# Patient Record
Sex: MALE
[De-identification: names, ages, dates, MRNs, and addresses within clinical notes are randomized per-mention and may not be internally consistent; named-entity substitution may affect disease eponyms.]

---

## 2021-01-05 PROBLEM — Z00.00 ENCOUNTER FOR PREVENTIVE HEALTH EXAMINATION: Status: ACTIVE | Noted: 2021-01-05

## 2021-01-07 ENCOUNTER — LABORATORY RESULT (OUTPATIENT)
Age: 31
End: 2021-01-07

## 2021-01-07 ENCOUNTER — APPOINTMENT (OUTPATIENT)
Dept: SURGERY | Facility: CLINIC | Age: 31
End: 2021-01-07
Payer: MEDICAID

## 2021-01-07 VITALS
BODY MASS INDEX: 22.88 KG/M2 | DIASTOLIC BLOOD PRESSURE: 84 MMHG | HEIGHT: 68 IN | SYSTOLIC BLOOD PRESSURE: 133 MMHG | HEART RATE: 64 BPM | WEIGHT: 151 LBS

## 2021-01-07 VITALS — TEMPERATURE: 97.6 F

## 2021-01-07 DIAGNOSIS — Z56.0 UNEMPLOYMENT, UNSPECIFIED: ICD-10-CM

## 2021-01-07 DIAGNOSIS — K43.9 VENTRAL HERNIA W/OUT OBSTRUCTION OR GANGRENE: ICD-10-CM

## 2021-01-07 DIAGNOSIS — Z78.9 OTHER SPECIFIED HEALTH STATUS: ICD-10-CM

## 2021-01-07 PROCEDURE — 99203 OFFICE O/P NEW LOW 30 MIN: CPT

## 2021-01-07 PROCEDURE — 99072 ADDL SUPL MATRL&STAF TM PHE: CPT

## 2021-01-07 SDOH — ECONOMIC STABILITY - INCOME SECURITY: UNEMPLOYMENT, UNSPECIFIED: Z56.0

## 2021-01-07 NOTE — PHYSICAL EXAM
[Normal Breath Sounds] : Normal breath sounds [Normal Rate and Rhythm] : normal rate and rhythm [No Rash or Lesion] : No rash or lesion [Alert] : alert [Oriented to Person] : oriented to person [Oriented to Place] : oriented to place [Oriented to Time] : oriented to time [Calm] : calm [JVD] : no jugular venous distention  [de-identified] : A/Ox3; NAD. appears comfortable [de-identified] : EOMI; sclera anicteric. Nasal mucosa pink, septum midline. Oral mucosa pink. Tongue midline, Pharynx without exudates. [de-identified] : abd is soft, NT/ND\par reducible ventral hernia  [de-identified] : +ROM, no joint swelling

## 2021-01-07 NOTE — DATA REVIEWED
[FreeTextEntry1] : Date of Exam: 12-\par  \par EXAM:  US HERNIA ABDOMINAL WALL\par \par HISTORY:  Evaluate for hernia.\par \par TECHNIQUE: Longitudinal and transverse grayscale images were acquired of the anterior abdominal wall using high frequency transducers. The examination was performed in the supine and standing positions with and without Valsalva maneuver.\par \par COMPARISON:  None Available.\par \par FINDINGS:\par \par There is a fat-containing ventral hernia at 11:00, 5.6 cm from the umbilicus. No herniated bowel is seen. Hernia contents are partially reducible with external pressure.\par MEASUREMENTS: \par Supine with Valsalva\par Hernia contents:1.4 x 0.6 x 1.2 cm  (Transverse x AP x CC)\par Hernia neck:0.2 cm (Transverse)\par Standing with Valsalva\par Hernia contents:1.8 x 0.5 x 1.9 cm (Transverse x AP x CC)\par \par There is a fat-containing ventral hernia at 11:00, 4.6 cm from the umbilicus. No herniated bowel is seen. Hernia contents are partially reducible with external pressure.\par MEASUREMENTS: \par Supine with Valsalva\par Hernia contents:2.6 x 0.7 x 2.8 cm  (Transverse x AP x CC)\par Hernia neck:0.4 cm (Transverse)\par Standing with Valsalva\par Hernia contents:3.0 x 0.8 x 3.2 cm (Transverse x AP x CC)\par \par There is a fat-containing ventral hernia at 12:00, 2.6 cm from the umbilicus. No herniated bowel is seen. Hernia contents are partially reducible with external pressure.\par MEASUREMENTS: \par Supine with Valsalva\par Hernia contents:1.9 x 0.8 x 3.1 cm  (Transverse x AP x CC)\par Hernia neck:0.4 cm (Transverse)\par Standing with Valsalva\par Hernia contents:2.0 x 0.8 x 3.2 cm (Transverse x AP x CC)\par \par \par IMPRESSION:\par 1.  Fat-containing, partially reducible ventral hernia at 11:00, 5.6 cm from the umbilicus. \par 2.  Fat-containing, partially reducible ventral hernia at 11:00, 4.6 cm from the umbilicus.\par 3.  Fat-containing, partially reducible ventral hernia at 12:00, 2.6 cm from the umbilicus.

## 2021-01-07 NOTE — PLAN
[FreeTextEntry1] : Mr. ISABELLE MUNIZ  was informed of significance of findings. All options, risks and benefits were discussed at length. Informed consent for excision of, repair of ventral hernia and the  potential post op complications were discussed including infection, recurrence and other related complications were discussed. Potential risks, benefits and alternatives (surgical options were discussed including non-surgical options or the option of no surgery) to the planned surgery were discussed in depth. All surgical options were discussed including non-surgical treatments. The patient wishes to proceed with surgery. We will plan for surgery on at the next available date, pending any required insurance pre-certification or pre-approval. He agrees to obtain any necessary pre-operative evaluations and testing prior to surgery.\par Patient advised to seek immediate medical attention with any acute change in symptoms or with the development of any new or worsening symptoms. Patient's questions and concerns addressed to patient's satisfaction, and patient verbalized an understanding of the information discussed.\par \par Will schedule for the repair at [facility] [place].\par \par

## 2021-01-07 NOTE — ASSESSMENT
[FreeTextEntry1] : ISABELLE MUNIZ is a 30 year old M who is referred to the office for consultation visit, he presents w the cc of having a hernia which is causing him discomfort. Patient also had an Abd US which was c/w fat containing, partially reducible ventral hernia. \par Clinically, defect noted above umbilicus, c/w ventral hernia.

## 2021-01-07 NOTE — HISTORY OF PRESENT ILLNESS
[de-identified] : ISABELLE MUNIZ is a 30 year old M who is referred to the office for consultation visit, he presents w the cc of having a hernia which is causing him discomfort ,  to the abdominal wall, above the umbilicus. Patient states he's had it there for a while, which has been beginning to cause pain.

## 2021-01-07 NOTE — REVIEW OF SYSTEMS
[As Noted in HPI] : as noted in HPI [Negative] : Heme/Lymph [FreeTextEntry7] : hernia, above umbilicus, bulges, causes pain

## 2021-01-07 NOTE — CONSULT LETTER
[Dear  ___] : Dear  [unfilled], [Consult Letter:] : I had the pleasure of evaluating your patient, [unfilled]. [Consult Closing:] : Thank you very much for allowing me to participate in the care of this patient.  If you have any questions, please do not hesitate to contact me. [Sincerely,] : Sincerely, [FreeTextEntry3] : Keith Hopkins MD\par

## 2021-01-08 LAB
ALBUMIN SERPL ELPH-MCNC: 4.8 G/DL
ALP BLD-CCNC: 75 U/L
ALT SERPL-CCNC: 21 U/L
ANION GAP SERPL CALC-SCNC: 12 MMOL/L
AST SERPL-CCNC: 21 U/L
BASOPHILS # BLD AUTO: 0.02 K/UL
BASOPHILS NFR BLD AUTO: 0.4 %
BILIRUB SERPL-MCNC: 0.4 MG/DL
BUN SERPL-MCNC: 12 MG/DL
CALCIUM SERPL-MCNC: 9.8 MG/DL
CHLORIDE SERPL-SCNC: 102 MMOL/L
CO2 SERPL-SCNC: 23 MMOL/L
CREAT SERPL-MCNC: 1.11 MG/DL
EOSINOPHIL # BLD AUTO: 0.02 K/UL
EOSINOPHIL NFR BLD AUTO: 0.4 %
GLUCOSE SERPL-MCNC: 94 MG/DL
HCT VFR BLD CALC: 46.4 %
HGB BLD-MCNC: 14.6 G/DL
IMM GRANULOCYTES NFR BLD AUTO: 0.4 %
LYMPHOCYTES # BLD AUTO: 1.6 K/UL
LYMPHOCYTES NFR BLD AUTO: 30.7 %
MAN DIFF?: NORMAL
MCHC RBC-ENTMCNC: 30.2 PG
MCHC RBC-ENTMCNC: 31.5 GM/DL
MCV RBC AUTO: 96.1 FL
MONOCYTES # BLD AUTO: 0.36 K/UL
MONOCYTES NFR BLD AUTO: 6.9 %
NEUTROPHILS # BLD AUTO: 3.2 K/UL
NEUTROPHILS NFR BLD AUTO: 61.2 %
PLATELET # BLD AUTO: 145 K/UL
POTASSIUM SERPL-SCNC: 4.4 MMOL/L
PROT SERPL-MCNC: 7.9 G/DL
RBC # BLD: 4.83 M/UL
RBC # FLD: 14.5 %
SODIUM SERPL-SCNC: 137 MMOL/L
WBC # FLD AUTO: 5.22 K/UL

## 2021-02-09 ENCOUNTER — APPOINTMENT (OUTPATIENT)
Dept: DISASTER EMERGENCY | Facility: CLINIC | Age: 31
End: 2021-02-09

## 2021-02-14 ENCOUNTER — APPOINTMENT (OUTPATIENT)
Dept: DISASTER EMERGENCY | Facility: CLINIC | Age: 31
End: 2021-02-14

## 2021-02-14 DIAGNOSIS — Z01.818 ENCOUNTER FOR OTHER PREPROCEDURAL EXAMINATION: ICD-10-CM

## 2021-02-17 ENCOUNTER — APPOINTMENT (OUTPATIENT)
Dept: SURGERY | Facility: HOSPITAL | Age: 31
End: 2021-02-17

## 2021-06-23 ENCOUNTER — OUTPATIENT (OUTPATIENT)
Dept: OUTPATIENT SERVICES | Facility: HOSPITAL | Age: 31
LOS: 1 days | End: 2021-06-23
Payer: MEDICAID

## 2021-06-23 DIAGNOSIS — Z11.52 ENCOUNTER FOR SCREENING FOR COVID-19: ICD-10-CM

## 2021-06-23 LAB — SARS-COV-2 RNA SPEC QL NAA+PROBE: SIGNIFICANT CHANGE UP

## 2021-06-23 PROCEDURE — 87635 SARS-COV-2 COVID-19 AMP PRB: CPT
